# Patient Record
Sex: MALE | Race: WHITE | NOT HISPANIC OR LATINO | Employment: FULL TIME | URBAN - METROPOLITAN AREA
[De-identification: names, ages, dates, MRNs, and addresses within clinical notes are randomized per-mention and may not be internally consistent; named-entity substitution may affect disease eponyms.]

---

## 2023-06-07 ENCOUNTER — TELEPHONE (OUTPATIENT)
Dept: FAMILY MEDICINE | Facility: CLINIC | Age: 48
End: 2023-06-07

## 2023-06-09 RX ORDER — AMLODIPINE BESYLATE 5 MG/1
5 TABLET ORAL
COMMUNITY
Start: 2023-06-07 | End: 2023-09-19 | Stop reason: ALTCHOICE

## 2023-09-15 PROBLEM — E66.9 OBESITY, UNSPECIFIED: Status: ACTIVE | Noted: 2023-09-15

## 2023-09-15 PROBLEM — I10 ESSENTIAL HYPERTENSION, BENIGN: Status: ACTIVE | Noted: 2023-09-15

## 2023-09-19 ENCOUNTER — OFFICE VISIT (OUTPATIENT)
Dept: FAMILY MEDICINE | Facility: CLINIC | Age: 48
End: 2023-09-19
Payer: COMMERCIAL

## 2023-09-19 VITALS
RESPIRATION RATE: 16 BRPM | OXYGEN SATURATION: 96 % | BODY MASS INDEX: 46.65 KG/M2 | HEIGHT: 69 IN | TEMPERATURE: 98 F | SYSTOLIC BLOOD PRESSURE: 145 MMHG | DIASTOLIC BLOOD PRESSURE: 90 MMHG | WEIGHT: 315 LBS | HEART RATE: 71 BPM

## 2023-09-19 DIAGNOSIS — I10 ESSENTIAL HYPERTENSION, BENIGN: ICD-10-CM

## 2023-09-19 DIAGNOSIS — K13.4 GRANULOMA AND GRANULOMA-LIKE LESIONS OF ORAL MUCOSA: Primary | ICD-10-CM

## 2023-09-19 DIAGNOSIS — G47.33 OSA (OBSTRUCTIVE SLEEP APNEA): ICD-10-CM

## 2023-09-19 DIAGNOSIS — E66.01 CLASS 3 SEVERE OBESITY DUE TO EXCESS CALORIES WITH SERIOUS COMORBIDITY AND BODY MASS INDEX (BMI) OF 50.0 TO 59.9 IN ADULT: ICD-10-CM

## 2023-09-19 PROBLEM — F17.200 SMOKER: Status: ACTIVE | Noted: 2023-09-19

## 2023-09-19 PROBLEM — F41.9 ANXIETY: Status: ACTIVE | Noted: 2023-09-19

## 2023-09-19 PROBLEM — E66.9 OBESITY, UNSPECIFIED: Status: ACTIVE | Noted: 2023-09-19

## 2023-09-19 PROBLEM — E66.9 OBESITY, UNSPECIFIED: Status: RESOLVED | Noted: 2023-09-19 | Resolved: 2023-09-19

## 2023-09-19 PROBLEM — E66.813 CLASS 3 SEVERE OBESITY DUE TO EXCESS CALORIES WITH SERIOUS COMORBIDITY AND BODY MASS INDEX (BMI) OF 50.0 TO 59.9 IN ADULT: Status: ACTIVE | Noted: 2023-09-15

## 2023-09-19 PROBLEM — E55.9 VITAMIN D DEFICIENCY: Status: ACTIVE | Noted: 2023-09-19

## 2023-09-19 PROBLEM — Z87.442 HISTORY OF KIDNEY STONES: Status: ACTIVE | Noted: 2023-09-19

## 2023-09-19 PROBLEM — R79.89 ELEVATED LFTS: Status: ACTIVE | Noted: 2023-09-19

## 2023-09-19 PROBLEM — K76.0 FATTY LIVER: Status: ACTIVE | Noted: 2023-09-19

## 2023-09-19 PROBLEM — K21.9 GERD (GASTROESOPHAGEAL REFLUX DISEASE): Status: ACTIVE | Noted: 2023-09-19

## 2023-09-19 PROCEDURE — 1160F RVW MEDS BY RX/DR IN RCRD: CPT | Mod: CPTII,,, | Performed by: FAMILY MEDICINE

## 2023-09-19 PROCEDURE — 3077F PR MOST RECENT SYSTOLIC BLOOD PRESSURE >= 140 MM HG: ICD-10-PCS | Mod: CPTII,,, | Performed by: FAMILY MEDICINE

## 2023-09-19 PROCEDURE — 1159F PR MEDICATION LIST DOCUMENTED IN MEDICAL RECORD: ICD-10-PCS | Mod: CPTII,,, | Performed by: FAMILY MEDICINE

## 2023-09-19 PROCEDURE — 3080F PR MOST RECENT DIASTOLIC BLOOD PRESSURE >= 90 MM HG: ICD-10-PCS | Mod: CPTII,,, | Performed by: FAMILY MEDICINE

## 2023-09-19 PROCEDURE — 1160F PR REVIEW ALL MEDS BY PRESCRIBER/CLIN PHARMACIST DOCUMENTED: ICD-10-PCS | Mod: CPTII,,, | Performed by: FAMILY MEDICINE

## 2023-09-19 PROCEDURE — 99214 PR OFFICE/OUTPT VISIT, EST, LEVL IV, 30-39 MIN: ICD-10-PCS | Mod: ,,, | Performed by: FAMILY MEDICINE

## 2023-09-19 PROCEDURE — 1159F MED LIST DOCD IN RCRD: CPT | Mod: CPTII,,, | Performed by: FAMILY MEDICINE

## 2023-09-19 PROCEDURE — 3077F SYST BP >= 140 MM HG: CPT | Mod: CPTII,,, | Performed by: FAMILY MEDICINE

## 2023-09-19 PROCEDURE — 3008F BODY MASS INDEX DOCD: CPT | Mod: CPTII,,, | Performed by: FAMILY MEDICINE

## 2023-09-19 PROCEDURE — 3080F DIAST BP >= 90 MM HG: CPT | Mod: CPTII,,, | Performed by: FAMILY MEDICINE

## 2023-09-19 PROCEDURE — 3008F PR BODY MASS INDEX (BMI) DOCUMENTED: ICD-10-PCS | Mod: CPTII,,, | Performed by: FAMILY MEDICINE

## 2023-09-19 PROCEDURE — 99214 OFFICE O/P EST MOD 30 MIN: CPT | Mod: ,,, | Performed by: FAMILY MEDICINE

## 2023-09-19 RX ORDER — AMLODIPINE AND BENAZEPRIL HYDROCHLORIDE 5; 20 MG/1; MG/1
1 CAPSULE ORAL DAILY
Qty: 30 CAPSULE | Refills: 2 | Status: SHIPPED | OUTPATIENT
Start: 2023-09-19

## 2023-09-19 RX ORDER — AMLODIPINE AND BENAZEPRIL HYDROCHLORIDE 5; 20 MG/1; MG/1
1 CAPSULE ORAL DAILY
Qty: 90 CAPSULE | Refills: 3 | Status: SHIPPED | OUTPATIENT
Start: 2023-09-19 | End: 2023-09-19

## 2023-09-19 NOTE — PROGRESS NOTES
"  Patient Name: Sotero Wray     Patient ID: [id number]     Chief Complaint: Medication Refill      HPI:     Sotero Wray is a 48 y.o. male here today for Hypertension follow up.  Patient says he has a wart on his right thigh.    Past Medical History:   Diagnosis Date    Anxiety     Elevated LFTs     Essential hypertension, benign     Fatty liver     GERD (gastroesophageal reflux disease)     History of kidney stones     Obesity, unspecified     PILI (obstructive sleep apnea)     Smoker     Vitamin D deficiency         Past Surgical History:   Procedure Laterality Date    ADENOIDECTOMY      SINUS SURGERY      TONSILLECTOMY          Social History     Socioeconomic History    Marital status: Single    Number of children: 3   Tobacco Use    Smoking status: Every Day     Current packs/day: 1.00     Average packs/day: 1 pack/day for 25.7 years (25.7 ttl pk-yrs)     Types: Cigarettes     Start date: 1998    Smokeless tobacco: Never   Substance and Sexual Activity    Alcohol use: Yes     Comment: occasionally    Drug use: Yes        Current Outpatient Medications   Medication Instructions    amlodipine-benazepril 5-20 mg (LOTREL) 5-20 mg per capsule 1 capsule, Oral, Daily       Review of patient's allergies indicates:  No Known Allergies       There is no immunization history on file for this patient.    Patient Care Team:  Wan Mendoza Sr., MD as PCP - General (Family Medicine)     Subjective:     Review of Systems    10 point review of systems conducted, negative except as stated in the history of present illness. See HPI for details.    Objective:     Visit Vitals  BP (!) 145/90 (BP Location: Right arm, Patient Position: Sitting, BP Method: Large (Manual))   Pulse 71   Temp 98.2 °F (36.8 °C)   Resp 16   Ht 5' 9" (1.753 m)   Wt (!) 162.9 kg (359 lb 3.2 oz)   SpO2 96%   BMI 53.04 kg/m²       Physical Exam  Constitutional:       Appearance: He is obese.   HENT:      Head: Normocephalic and atraumatic. "   Cardiovascular:      Rate and Rhythm: Normal rate and regular rhythm.   Pulmonary:      Effort: Pulmonary effort is normal.      Breath sounds: Normal breath sounds.   Abdominal:      Palpations: Abdomen is soft.      Tenderness: There is no abdominal tenderness.   Musculoskeletal:         General: No swelling or tenderness. Normal range of motion.      Cervical back: Normal range of motion and neck supple.      Right lower leg: No edema.      Left lower leg: No edema.   Lymphadenopathy:      Cervical: No cervical adenopathy.   Skin:     General: Skin is warm and dry.      Comments: Patient has an approximately 2-1/4 x 11/4 cm red ,raised, firm, indurated lesion on the inside of his right mid thigh.  There is no pointing or drainage.  It is minimally tender.   Neurological:      General: No focal deficit present.      Mental Status: He is alert and oriented to person, place, and time.   Psychiatric:         Mood and Affect: Mood normal.           Assessment:       ICD-10-CM ICD-9-CM   1. PossibleGranuloma   K13.4 528.9   2. Essential hypertension, benign  I10 401.1   3. PILI (obstructive sleep apnea)  G47.33 327.23   4. Class 3 severe obesity due to excess calories with serious comorbidity and body mass index (BMI) of 50.0 to 59.9 in adult  E66.01 278.01    Z68.43 V85.43        Plan:     1. PossibleGranuloma   Comments:  We will refer patient to a surgeon for excision.  Orders:  -     Ambulatory referral/consult to General Surgery; Future; Expected date: 09/19/2023    2. Essential hypertension, benign  Overview:  Patient is not at goal.  Low Sodium Diet (DASH Diet - Less than 2 grams of sodium per day).  Monitor blood pressure daily and log. Report consistent numbers greater than 140/90.  Maintain healthy weight with goal BMI <30. Exercise 30 minutes per day, 5 days per week.    Assessment & Plan:  D/C Norvasc.  Will start Lotrel 5-20 mg daily.    Orders:  -     Discontinue: amlodipine-benazepril 5-20 mg (LOTREL)  5-20 mg per capsule; Take 1 capsule by mouth once daily.  Dispense: 90 capsule; Refill: 3  -     amlodipine-benazepril 5-20 mg (LOTREL) 5-20 mg per capsule; Take 1 capsule by mouth once daily.  Dispense: 30 capsule; Refill: 2    3. PILI (obstructive sleep apnea)  Overview:  Wears CPAP/BiPAP and reports compliance nightly. Life time use expected.  Has experienced improved daytime fatigue.  Avoid excessive alcohol, smoking and overuse of sedatives at bedtime.  Weight management discussed. Goal BMI <30.  Adjust sleep position PRN.      4. Class 3 severe obesity due to excess calories with serious comorbidity and body mass index (BMI) of 50.0 to 59.9 in adult  Overview:  Body mass index is 53.04 kg/m².  Goal BMI <30.  Exercise 5 times a week for 30 minutes per day.  Avoid soda, simple sugars, excessive rice, potatoes or bread. Limit fast foods and fried foods.  Choose complex carbs in moderation (example: green vegetables, beans, oatmeal). Eat plenty of fresh fruits and vegetables with lean meats daily.  Do not skip meals. Eat a balanced portion size.  Avoid fad diets. Consider permanent healthy life style changes.           [] Discussed lab findings with the patient.  [x] Discussed diet, exercise and if appropriate, weight loss.  [x] Instructions and information, including risks and benefits of prescribed medication(s) have been reviewed with the patient and patient verbalizes understanding. Questions have been answered to the patient's satisfaction.  [] Appropriate counseling has been given regarding anxiety and depressive issues that were discussed today.  [] Any lab drawn today will be reviewed by physician at the time it is received and appropriate recommendations bill be made and discussed with patient.     Follow up in about 3 weeks (around 10/10/2023) for BP Check with Nurse.   In addition to their scheduled follow up, the patient has also been instructed to follow up on as needed basis.     Future Appointments    Date Time Provider Department Center   10/9/2023 10:30 AM NURSE, [facility] BRITTNEE Mendoza Sr, MD

## 2023-10-09 ENCOUNTER — CLINICAL SUPPORT (OUTPATIENT)
Dept: FAMILY MEDICINE | Facility: CLINIC | Age: 48
End: 2023-10-09
Payer: COMMERCIAL

## 2023-10-09 VITALS
OXYGEN SATURATION: 95 % | BODY MASS INDEX: 46.65 KG/M2 | WEIGHT: 315 LBS | HEART RATE: 82 BPM | DIASTOLIC BLOOD PRESSURE: 82 MMHG | HEIGHT: 69 IN | RESPIRATION RATE: 16 BRPM | SYSTOLIC BLOOD PRESSURE: 122 MMHG | TEMPERATURE: 97 F

## 2023-10-09 DIAGNOSIS — I10 ESSENTIAL HYPERTENSION, BENIGN: Primary | ICD-10-CM

## 2023-10-09 NOTE — PROGRESS NOTES
Patient was given blood pressure medicine at appointment on 9/19/2023, and came in today for a blood pressure check. Blood pressure is regulated to 122/82. Patient was scheduled for full lab and 6 month follow-up appointment.

## 2023-10-16 ENCOUNTER — TELEPHONE (OUTPATIENT)
Dept: FAMILY MEDICINE | Facility: CLINIC | Age: 48
End: 2023-10-16
Payer: COMMERCIAL

## 2023-10-16 NOTE — TELEPHONE ENCOUNTER
----- Message from Delia Wray sent at 10/16/2023  2:07 PM CDT -----  Regarding: CPAP Letter  Jason needs a letter of compliance for his CPAP.  He is taking a CDL physicial today.

## 2023-10-16 NOTE — TELEPHONE ENCOUNTER
Spoke with patient in regards to CPAP letter of compliance and let him know that he has to contact the company he gets his CPAP machine from in order to receive that letter.

## 2024-06-27 DIAGNOSIS — I10 ESSENTIAL HYPERTENSION, BENIGN: ICD-10-CM

## 2024-06-27 RX ORDER — AMLODIPINE AND BENAZEPRIL HYDROCHLORIDE 5; 20 MG/1; MG/1
1 CAPSULE ORAL DAILY
Qty: 30 CAPSULE | Refills: 2 | Status: SHIPPED | OUTPATIENT
Start: 2024-06-27

## 2024-09-09 ENCOUNTER — OFFICE VISIT (OUTPATIENT)
Dept: FAMILY MEDICINE | Facility: CLINIC | Age: 49
End: 2024-09-09
Payer: COMMERCIAL

## 2024-09-09 VITALS
RESPIRATION RATE: 20 BRPM | BODY MASS INDEX: 46.65 KG/M2 | OXYGEN SATURATION: 95 % | HEIGHT: 69 IN | HEART RATE: 69 BPM | SYSTOLIC BLOOD PRESSURE: 120 MMHG | DIASTOLIC BLOOD PRESSURE: 83 MMHG | WEIGHT: 315 LBS | TEMPERATURE: 98 F

## 2024-09-09 DIAGNOSIS — E78.2 MIXED HYPERLIPIDEMIA: ICD-10-CM

## 2024-09-09 DIAGNOSIS — Z12.11 SCREENING FOR COLON CANCER: ICD-10-CM

## 2024-09-09 DIAGNOSIS — E66.01 CLASS 3 SEVERE OBESITY DUE TO EXCESS CALORIES WITH SERIOUS COMORBIDITY AND BODY MASS INDEX (BMI) OF 50.0 TO 59.9 IN ADULT: ICD-10-CM

## 2024-09-09 DIAGNOSIS — R74.01 TRANSAMINITIS: ICD-10-CM

## 2024-09-09 DIAGNOSIS — I10 ESSENTIAL HYPERTENSION, BENIGN: ICD-10-CM

## 2024-09-09 DIAGNOSIS — E55.9 VITAMIN D DEFICIENCY: Primary | ICD-10-CM

## 2024-09-09 PROBLEM — E78.5 HYPERLIPIDEMIA: Status: ACTIVE | Noted: 2024-09-09

## 2024-09-09 PROCEDURE — 3008F BODY MASS INDEX DOCD: CPT | Mod: CPTII,,, | Performed by: FAMILY MEDICINE

## 2024-09-09 PROCEDURE — 3074F SYST BP LT 130 MM HG: CPT | Mod: CPTII,,, | Performed by: FAMILY MEDICINE

## 2024-09-09 PROCEDURE — 1159F MED LIST DOCD IN RCRD: CPT | Mod: CPTII,,, | Performed by: FAMILY MEDICINE

## 2024-09-09 PROCEDURE — 1160F RVW MEDS BY RX/DR IN RCRD: CPT | Mod: CPTII,,, | Performed by: FAMILY MEDICINE

## 2024-09-09 PROCEDURE — 4010F ACE/ARB THERAPY RXD/TAKEN: CPT | Mod: CPTII,,, | Performed by: FAMILY MEDICINE

## 2024-09-09 PROCEDURE — 3044F HG A1C LEVEL LT 7.0%: CPT | Mod: CPTII,,, | Performed by: FAMILY MEDICINE

## 2024-09-09 PROCEDURE — 3079F DIAST BP 80-89 MM HG: CPT | Mod: CPTII,,, | Performed by: FAMILY MEDICINE

## 2024-09-09 PROCEDURE — 99214 OFFICE O/P EST MOD 30 MIN: CPT | Mod: ,,, | Performed by: FAMILY MEDICINE

## 2024-09-09 RX ORDER — AMLODIPINE AND BENAZEPRIL HYDROCHLORIDE 5; 20 MG/1; MG/1
1 CAPSULE ORAL DAILY
Qty: 90 CAPSULE | Refills: 1 | Status: SHIPPED | OUTPATIENT
Start: 2024-09-09 | End: 2025-03-08

## 2024-09-09 NOTE — ASSESSMENT & PLAN NOTE
We would like referral to obesity medicine/bariatric   Insurance will not pay for semaglutide/Tirzepatide for weight loss  Would consider bariatric if insurance paid for

## 2024-09-09 NOTE — PROGRESS NOTES
Family Medicine    Patient ID: [id number]     Chief Complaint: Follow-up (Lab results), Medication Refill (Blood pressure), and Health Maintenance (Consult about weight management )      HPI:     Sotero Wray is a 49 y.o. male here today for a follow up.     Past Medical History:   Diagnosis Date    Anxiety     Elevated LFTs     Essential hypertension, benign     Fatty liver     GERD (gastroesophageal reflux disease)     History of kidney stones     Obesity, unspecified     PILI (obstructive sleep apnea)     Smoker     Vitamin D deficiency         Past Surgical History:   Procedure Laterality Date    ADENOIDECTOMY      SINUS SURGERY      TONSILLECTOMY          Social History     Tobacco Use    Smoking status: Every Day     Current packs/day: 1.00     Average packs/day: 1 pack/day for 26.7 years (26.7 ttl pk-yrs)     Types: Cigarettes     Start date: 1998    Smokeless tobacco: Never   Substance and Sexual Activity    Alcohol use: Yes     Comment: occasionally    Drug use: Not Currently    Sexual activity: Not Currently        Current Outpatient Medications   Medication Instructions    amlodipine-benazepril 5-20 mg (LOTREL) 5-20 mg per capsule 1 capsule, Oral, Daily       Review of patient's allergies indicates:  No Known Allergies     Patient Care Team:  Wan Mendoza Sr., MD as PCP - General (Family Medicine)     Subjective:     Review of Systems   Constitutional:  Negative for activity change, appetite change, fever and unexpected weight change.   HENT:  Negative for congestion, dental problem, rhinorrhea and trouble swallowing.    Eyes:  Negative for visual disturbance.   Respiratory:  Negative for chest tightness and shortness of breath.    Cardiovascular:  Negative for chest pain, palpitations and leg swelling.   Gastrointestinal:  Negative for abdominal pain, blood in stool, constipation, diarrhea, nausea and vomiting.   Genitourinary:  Negative for difficulty urinating.   Musculoskeletal:  Negative  "for gait problem.   Skin:  Negative for rash and wound.   Neurological:  Negative for dizziness, syncope, speech difficulty, weakness and light-headedness.   Psychiatric/Behavioral:  Negative for confusion and suicidal ideas.        12 point review of systems conducted, negative except as stated in the history of present illness. See HPI for details.    Objective:     Visit Vitals  /83 (BP Location: Left arm, Patient Position: Sitting)   Pulse 69   Temp 98 °F (36.7 °C)   Resp 20   Ht 5' 9" (1.753 m)   Wt (!) 160.8 kg (354 lb 9.6 oz)   SpO2 95%   BMI 52.37 kg/m²       Physical Exam  Vitals and nursing note reviewed.   Constitutional:       General: He is not in acute distress.     Appearance: Normal appearance. He is not ill-appearing, toxic-appearing or diaphoretic.   HENT:      Head: Normocephalic and atraumatic.      Right Ear: Tympanic membrane, ear canal and external ear normal. There is no impacted cerumen.      Left Ear: Tympanic membrane, ear canal and external ear normal. There is no impacted cerumen.      Nose: No congestion or rhinorrhea.      Mouth/Throat:      Pharynx: Oropharynx is clear. No oropharyngeal exudate or posterior oropharyngeal erythema.   Eyes:      General:         Right eye: No discharge.         Left eye: No discharge.      Extraocular Movements: Extraocular movements intact.      Conjunctiva/sclera: Conjunctivae normal.   Cardiovascular:      Rate and Rhythm: Normal rate and regular rhythm.      Heart sounds: No murmur heard.     No friction rub. No gallop.   Pulmonary:      Effort: Pulmonary effort is normal. No respiratory distress.      Breath sounds: Normal breath sounds.   Musculoskeletal:      Right lower leg: No edema.      Left lower leg: No edema.   Skin:     Capillary Refill: Capillary refill takes less than 2 seconds.   Neurological:      Mental Status: He is alert and oriented to person, place, and time.   Psychiatric:         Mood and Affect: Mood normal.         " "Behavior: Behavior normal.         Thought Content: Thought content normal.         Labs Reviewed:     Chemistry:  Lab Results   Component Value Date     (H) 09/04/2024    K 4.7 09/04/2024    BUN 21 09/04/2024    CREATININE 1.10 09/04/2024    EGFRNORACEVR 83 09/04/2024    CALCIUM 8.9 09/04/2024    ALBUMIN 4.2 09/04/2024    BILIDIR 0.22 02/08/2024    AST 24 09/04/2024    ALT 53 (H) 09/04/2024    UYQSPWXE05JP 29.2 (L) 09/04/2024    TSH 1.190 09/04/2024        Lab Results   Component Value Date    HGBA1C 5.6 09/04/2024        Hematology:  Lab Results   Component Value Date    WBC 7.1 09/04/2024    HGB 14.9 09/04/2024    HCT 47.8 09/04/2024     09/04/2024       Lipid Panel:  Lab Results   Component Value Date    CHOL 192 09/04/2024    HDL 62 09/04/2024    TRIG 108 09/04/2024        Urine:  No results found for: "COLORUA", "APPEARANCEUA", "SGUA", "PHUA", "PROTEINUA", "GLUCOSEUA", "KETONESUA", "BLOODUA", "NITRITESUA", "LEUKOCYTESUR", "RBCUA", "WBCUA", "BACTERIA", "SQEPUA", "HYALINECASTS", "CREATRANDUR", "PROTEINURINE", "UPROTCREA"     Assessment:       ICD-10-CM ICD-9-CM   1. Vitamin D deficiency  E55.9 268.9   2. Mixed hyperlipidemia  E78.2 272.2   3. Transaminitis  R74.01 790.4   4. Essential hypertension, benign  I10 401.1   5. Class 3 severe obesity due to excess calories with serious comorbidity and body mass index (BMI) of 50.0 to 59.9 in adult  E66.01 278.01    Z68.43 V85.43   6. Screening for colon cancer  Z12.11 V76.51        Plan:     1. Vitamin D deficiency  Overview:  Background:  Vitamin-D level today:  29  Preferred range per Endocrine Society is 40-60 ng/mL (Ref)  Current supplementation:  Unknown    Plan:  3000 IU OTC vitamin-D once daily  Repeat vitamin-D in 3 months      Orders:  -     Vitamin D; Future; Expected date: 03/09/2025    2. Mixed hyperlipidemia  Overview:  Background:  Medication:  None  Risks:  BMI 52, current smoker  Optimal LDL-C: <100 (Ref)  Optimal non HDL-C: <130 " (Ref)  Optimal ApoB: <100 (Ref)  Risk factors:  BMI greater than 30  Current LDL-C:  111  Current non HDL-C:  130  Current ApoB:     Assessment/Plan:  Not at goal  Higher risk with BMI 52 and current smoker  Declines statin  Repeat lipids six-month  Recommend lifestyle modifications:  ? Diet:   - Adopt a mediterranean diet, with an emphasis on vegetables, fruits (limited tropical fruits), fish, poultry, non-tropical vegetable oils, nuts; minimize red meat.  - Significantly reduce consumption of bread, pasta, rice, potatoes, sweets, and sugary drinks  - Reduce saturated fat; eliminate fried foods and trans fats; choose grilled over fried food  - limit ultra processed food (items that come in a box or a wrapper) and eat a whole food diet  - Limit your portion sizes   ? Exercise:  - Regular aerobic activity (at least 150 minutes/week of moderate-intensity activity, 75 minutes/week of vigorous intensity activity, or an equivalent combination of both)  - Resistance exercises (weights, bands, body-weight) at least two times per week      AACE/ACE Consensus Statement on Dyslipidemia Management   (https://doi.org/10.4158/GD-5141-9688)      Orders:  -     Lipid Panel; Future; Expected date: 03/09/2025    3. Transaminitis  Overview:     Background:  AST normal, ALT in the 40s  A BMI greater than 50  ALP wnl  Total bilirubin wnl  Non-significant alcohol consumption  Non-significant acetaminophen use  HepC screen not done yet  TSH wnl     Plan:  Likely fatty liver  Liver ultrasound today   CMP 6 months  Obesity medicine referral made      Orders:  -     US Abdomen Limited_Liver; Future; Expected date: 09/09/2024  -     Comprehensive Metabolic Panel; Future; Expected date: 03/09/2025    4. Essential hypertension, benign  Overview:  Lotrel 5-20 daily    Assessment & Plan:  At goal   Continue above medication at same dose    Orders:  -     amlodipine-benazepril 5-20 mg (LOTREL) 5-20 mg per capsule; Take 1 capsule by mouth once  daily.  Dispense: 90 capsule; Refill: 1    5. Class 3 severe obesity due to excess calories with serious comorbidity and body mass index (BMI) of 50.0 to 59.9 in adult  Overview:  Body mass index is 52.37 kg/m².    Weight has been steady at BMI over 52   Tried and failed caloric restriction  No diabetes, A1c 5.6, elevated cholesterol  Current smoker    Assessment & Plan:  We would like referral to obesity medicine/bariatric   Insurance will not pay for semaglutide/Tirzepatide for weight loss  Would consider bariatric if insurance paid for    Orders:  -     Ambulatory referral/consult to Bariatric/Obesity Medicine; Future; Expected date: 09/16/2024    6. Screening for colon cancer  Comments:  No family or personal history.  Screening due we will order  Orders:  -     Ambulatory referral/consult to Gastroenterology; Future; Expected date: 09/16/2024     Declines all vaccines  We will discuss smoking at next visit    Follow up in about 6 months (around 3/9/2025) for Follow Up, With Labs Prior to Visit. In addition to their scheduled follow up, the patient has also been instructed to follow up on as needed basis.     No future appointments.     Guillermo Larios MD

## 2024-09-30 ENCOUNTER — HOSPITAL ENCOUNTER (OUTPATIENT)
Dept: RADIOLOGY | Facility: HOSPITAL | Age: 49
Discharge: HOME | End: 2024-09-30
Attending: FAMILY MEDICINE
Payer: COMMERCIAL

## 2024-09-30 DIAGNOSIS — R74.01 TRANSAMINITIS: ICD-10-CM

## 2024-09-30 PROCEDURE — 76705 ECHO EXAM OF ABDOMEN: CPT | Mod: TC

## 2024-10-02 ENCOUNTER — TELEPHONE (OUTPATIENT)
Dept: FAMILY MEDICINE | Facility: CLINIC | Age: 49
End: 2024-10-02
Payer: COMMERCIAL

## 2024-10-02 NOTE — TELEPHONE ENCOUNTER
----- Message from Guillermo Larios MD sent at 9/30/2024  4:58 PM CDT -----  Enlarged, fatty liver.  Treatment for this is weight loss.  We will repeat labs in 6 months

## 2024-10-21 ENCOUNTER — TELEPHONE (OUTPATIENT)
Dept: FAMILY MEDICINE | Facility: CLINIC | Age: 49
End: 2024-10-21
Payer: COMMERCIAL

## 2024-10-21 NOTE — TELEPHONE ENCOUNTER
Referral sent to Dr Ford  for screening colonoscopy appointment on 10/19/2024. Patient was a no show  per Magdalene at Dr Padilla's office.

## 2025-03-05 ENCOUNTER — RESULTS FOLLOW-UP (OUTPATIENT)
Dept: FAMILY MEDICINE | Facility: CLINIC | Age: 50
End: 2025-03-05

## 2025-03-10 ENCOUNTER — OFFICE VISIT (OUTPATIENT)
Dept: FAMILY MEDICINE | Facility: CLINIC | Age: 50
End: 2025-03-10
Payer: COMMERCIAL

## 2025-03-10 VITALS
HEART RATE: 74 BPM | BODY MASS INDEX: 46.65 KG/M2 | WEIGHT: 315 LBS | SYSTOLIC BLOOD PRESSURE: 122 MMHG | OXYGEN SATURATION: 96 % | DIASTOLIC BLOOD PRESSURE: 80 MMHG | HEIGHT: 69 IN | TEMPERATURE: 98 F | RESPIRATION RATE: 18 BRPM

## 2025-03-10 DIAGNOSIS — G89.29 CHRONIC PAIN OF RIGHT ANKLE: ICD-10-CM

## 2025-03-10 DIAGNOSIS — E55.9 VITAMIN D DEFICIENCY: ICD-10-CM

## 2025-03-10 DIAGNOSIS — E78.2 MIXED HYPERLIPIDEMIA: Primary | ICD-10-CM

## 2025-03-10 DIAGNOSIS — Z12.12 ENCOUNTER FOR COLORECTAL CANCER SCREENING: ICD-10-CM

## 2025-03-10 DIAGNOSIS — R74.01 TRANSAMINITIS: ICD-10-CM

## 2025-03-10 DIAGNOSIS — M25.571 CHRONIC PAIN OF RIGHT ANKLE: ICD-10-CM

## 2025-03-10 DIAGNOSIS — E87.5 HYPERKALEMIA: ICD-10-CM

## 2025-03-10 DIAGNOSIS — I10 ESSENTIAL HYPERTENSION, BENIGN: ICD-10-CM

## 2025-03-10 DIAGNOSIS — Z12.11 ENCOUNTER FOR COLORECTAL CANCER SCREENING: ICD-10-CM

## 2025-03-10 DIAGNOSIS — E66.01 CLASS 3 SEVERE OBESITY DUE TO EXCESS CALORIES WITH SERIOUS COMORBIDITY AND BODY MASS INDEX (BMI) OF 50.0 TO 59.9 IN ADULT: ICD-10-CM

## 2025-03-10 DIAGNOSIS — E66.813 CLASS 3 SEVERE OBESITY DUE TO EXCESS CALORIES WITH SERIOUS COMORBIDITY AND BODY MASS INDEX (BMI) OF 50.0 TO 59.9 IN ADULT: ICD-10-CM

## 2025-03-10 DIAGNOSIS — K76.0 FATTY LIVER: ICD-10-CM

## 2025-03-10 PROCEDURE — 3079F DIAST BP 80-89 MM HG: CPT | Mod: CPTII,,, | Performed by: FAMILY MEDICINE

## 2025-03-10 PROCEDURE — 1160F RVW MEDS BY RX/DR IN RCRD: CPT | Mod: CPTII,,, | Performed by: FAMILY MEDICINE

## 2025-03-10 PROCEDURE — 99214 OFFICE O/P EST MOD 30 MIN: CPT | Mod: ,,, | Performed by: FAMILY MEDICINE

## 2025-03-10 PROCEDURE — 1159F MED LIST DOCD IN RCRD: CPT | Mod: CPTII,,, | Performed by: FAMILY MEDICINE

## 2025-03-10 PROCEDURE — 3074F SYST BP LT 130 MM HG: CPT | Mod: CPTII,,, | Performed by: FAMILY MEDICINE

## 2025-03-10 PROCEDURE — 3044F HG A1C LEVEL LT 7.0%: CPT | Mod: CPTII,,, | Performed by: FAMILY MEDICINE

## 2025-03-10 PROCEDURE — 3008F BODY MASS INDEX DOCD: CPT | Mod: CPTII,,, | Performed by: FAMILY MEDICINE

## 2025-03-10 PROCEDURE — G2211 COMPLEX E/M VISIT ADD ON: HCPCS | Mod: ,,, | Performed by: FAMILY MEDICINE

## 2025-03-10 RX ORDER — AMLODIPINE AND BENAZEPRIL HYDROCHLORIDE 5; 20 MG/1; MG/1
1 CAPSULE ORAL DAILY
Qty: 90 CAPSULE | Refills: 1 | Status: SHIPPED | OUTPATIENT
Start: 2025-03-10 | End: 2025-09-06

## 2025-03-10 NOTE — ASSESSMENT & PLAN NOTE
Bariatric referral made but patient had to cancel due to work   Declines another referral   He will try calorie counting for the next six-months again  If not successful in 6 months, we will refer to bariatric again

## 2025-03-10 NOTE — PROGRESS NOTES
Family Medicine    Patient ID: [id number]     Chief Complaint: Annual Exam      HPI:     Sotero Wray is a 50 y.o. male here today for follow up on labs.    Past Medical History:   Diagnosis Date    Anxiety     Elevated LFTs     Essential hypertension, benign     Fatty liver     GERD (gastroesophageal reflux disease)     History of kidney stones     Obesity, unspecified     PILI (obstructive sleep apnea)     Smoker     Vitamin D deficiency         Past Surgical History:   Procedure Laterality Date    ADENOIDECTOMY      CHOLECYSTECTOMY      SINUS SURGERY      TONSILLECTOMY          Social History     Tobacco Use    Smoking status: Every Day     Current packs/day: 1.00     Average packs/day: 1 pack/day for 27.2 years (27.2 ttl pk-yrs)     Types: Cigarettes     Start date: 1998    Smokeless tobacco: Never   Substance and Sexual Activity    Alcohol use: Yes     Comment: occasionally    Drug use: Not Currently    Sexual activity: Not Currently        Current Outpatient Medications   Medication Instructions    amlodipine-benazepril 5-20 mg (LOTREL) 5-20 mg per capsule 1 capsule, Oral, Daily       Review of patient's allergies indicates:  No Known Allergies     Patient Care Team:  Wan Mendoza Sr., MD as PCP - General (Family Medicine)     Subjective:     Review of Systems   Constitutional:  Negative for activity change, appetite change, fever and unexpected weight change.   HENT:  Negative for congestion, dental problem, rhinorrhea (DHT) and trouble swallowing.    Eyes:  Negative for visual disturbance.   Respiratory:  Negative for chest tightness and shortness of breath.    Cardiovascular:  Negative for chest pain, palpitations and leg swelling.   Gastrointestinal:  Negative for abdominal pain, blood in stool, constipation, diarrhea, nausea and vomiting.   Genitourinary:  Negative for difficulty urinating.   Musculoskeletal:  Negative for gait problem.   Skin:  Negative for rash and wound.   Neurological:   "Negative for dizziness, syncope, speech difficulty, weakness and light-headedness.   Psychiatric/Behavioral:  Negative for confusion and suicidal ideas.        12 point review of systems conducted, negative except as stated in the history of present illness. See HPI for details.    Objective:     Visit Vitals  /80   Pulse 74   Temp 98.4 °F (36.9 °C) (Skin)   Resp 18   Ht 5' 9" (1.753 m)   Wt (!) 159.2 kg (351 lb)   SpO2 96%   BMI 51.83 kg/m²       Physical Exam  Vitals and nursing note reviewed.   Constitutional:       General: He is not in acute distress.     Appearance: Normal appearance. He is not ill-appearing, toxic-appearing or diaphoretic.   HENT:      Head: Normocephalic and atraumatic.      Right Ear: Tympanic membrane, ear canal and external ear normal. There is no impacted cerumen.      Left Ear: Tympanic membrane, ear canal and external ear normal. There is no impacted cerumen.      Nose: No congestion or rhinorrhea.      Mouth/Throat:      Pharynx: Oropharynx is clear. No oropharyngeal exudate or posterior oropharyngeal erythema.   Eyes:      General:         Right eye: No discharge.         Left eye: No discharge.      Extraocular Movements: Extraocular movements intact.      Conjunctiva/sclera: Conjunctivae normal.   Cardiovascular:      Rate and Rhythm: Normal rate and regular rhythm.      Heart sounds: No murmur heard.     No friction rub. No gallop.   Pulmonary:      Effort: Pulmonary effort is normal. No respiratory distress.      Breath sounds: Normal breath sounds.   Musculoskeletal:      Right lower leg: No edema.      Left lower leg: No edema.   Skin:     Capillary Refill: Capillary refill takes less than 2 seconds.   Neurological:      Mental Status: He is alert and oriented to person, place, and time.   Psychiatric:         Mood and Affect: Mood normal.         Behavior: Behavior normal.         Thought Content: Thought content normal.         Labs Reviewed:     Chemistry:  Lab Results " "  Component Value Date     (H) 03/03/2025    K 5.5 (H) 03/03/2025    BUN 21 03/03/2025    CREATININE 1.10 03/03/2025    EGFRNORACEVR 82 03/03/2025    CALCIUM 9.3 03/03/2025    ALBUMIN 4.3 03/03/2025    BILIDIR 0.22 02/08/2024    AST 24 03/03/2025    ALT 44 03/03/2025    FEWSLISY46AV 26.9 (L) 03/03/2025    TSH 1.350 03/03/2025        Lab Results   Component Value Date    HGBA1C 5.5 03/03/2025        Hematology:  Lab Results   Component Value Date    WBC 9.3 03/03/2025    HGB 15.6 03/03/2025    HCT 50.2 03/03/2025     03/03/2025       Lipid Panel:  Lab Results   Component Value Date    CHOL 182 03/03/2025    HDL 65 03/03/2025    TRIG 119 03/03/2025        Urine:  No results found for: "COLORUA", "APPEARANCEUA", "SGUA", "PHUA", "PROTEINUA", "GLUCOSEUA", "KETONESUA", "BLOODUA", "NITRITESUA", "LEUKOCYTESUR", "RBCUA", "WBCUA", "BACTERIA", "SQEPUA", "HYALINECASTS", "CREATRANDUR", "PROTEINURINE", "UPROTCREA"     Assessment:       ICD-10-CM ICD-9-CM   1. Mixed hyperlipidemia  E78.2 272.2   2. Essential hypertension, benign  I10 401.1   3. Vitamin D deficiency  E55.9 268.9   4. Transaminitis  R74.01 790.4   5. Fatty liver  K76.0 571.8   6. Hyperkalemia  E87.5 276.7   7. Class 3 severe obesity due to excess calories with serious comorbidity and body mass index (BMI) of 50.0 to 59.9 in adult  E66.813 278.01    Z68.43 V85.43    E66.01    8. Chronic pain of right ankle  M25.571 719.47    G89.29 338.29   9. Encounter for colorectal cancer screening  Z12.11 V76.51    Z12.12 V76.41        Plan:     1. Mixed hyperlipidemia  Overview:  Medication:  None  Risk category:  Borderline, BMI 52, smoker    ApoB:  Non-HDL-C:  117  LDL-C:  96    Plan:  At goal for borderline  Given severe obesity and smoking status, consider pushing to high-risk targets      Role of apolipoprotein B in the clinical management of cardiovascular risk in adults: An Expert Clinical Consensus from the National Lipid Association.   Journal of Clinical " Lipidology        Orders:  -     CBC Auto Differential; Future; Expected date: 09/10/2025  -     Comprehensive Metabolic Panel; Future; Expected date: 09/10/2025  -     Lipid Panel; Future; Expected date: 09/10/2025  -     Hemoglobin A1C; Future; Expected date: 09/10/2025  -     Urinalysis; Future; Expected date: 09/10/2025  -     Vitamin D; Future; Expected date: 09/10/2025    2. Essential hypertension, benign  Overview:  Lotrel 5-20 daily    Assessment & Plan:  At goal   Continue above medication at same dose    Orders:  -     CBC Auto Differential; Future; Expected date: 09/10/2025  -     Comprehensive Metabolic Panel; Future; Expected date: 09/10/2025  -     Lipid Panel; Future; Expected date: 09/10/2025  -     Hemoglobin A1C; Future; Expected date: 09/10/2025  -     Urinalysis; Future; Expected date: 09/10/2025  -     Vitamin D; Future; Expected date: 09/10/2025  -     amlodipine-benazepril 5-20 mg (LOTREL) 5-20 mg per capsule; Take 1 capsule by mouth once daily.  Dispense: 90 capsule; Refill: 1    3. Vitamin D deficiency  Overview:  Background:  Vitamin-D level today:  26  Preferred range per Endocrine Society is 40-60 ng/mL (Ref)  Current supplementation:  Unknown    Plan:  3000 IU OTC vitamin-D once daily  Repeat vitamin-D in 6 months      Orders:  -     CBC Auto Differential; Future; Expected date: 09/10/2025  -     Comprehensive Metabolic Panel; Future; Expected date: 09/10/2025  -     Lipid Panel; Future; Expected date: 09/10/2025  -     Hemoglobin A1C; Future; Expected date: 09/10/2025  -     Urinalysis; Future; Expected date: 09/10/2025  -     Vitamin D; Future; Expected date: 09/10/2025    4. Transaminitis  Overview:  Liver ultrasound shows fatty liver   BMI 52    Transaminitis resolved on labs today   See weight loss problem      Orders:  -     CBC Auto Differential; Future; Expected date: 09/10/2025  -     Comprehensive Metabolic Panel; Future; Expected date: 09/10/2025  -     Lipid Panel; Future;  Expected date: 09/10/2025  -     Hemoglobin A1C; Future; Expected date: 09/10/2025  -     Urinalysis; Future; Expected date: 09/10/2025  -     Vitamin D; Future; Expected date: 09/10/2025    5. Fatty liver  Overview:  Per ultrasound 2024   BMI 52   No transaminitis today   See obesity problem    Orders:  -     CBC Auto Differential; Future; Expected date: 09/10/2025  -     Comprehensive Metabolic Panel; Future; Expected date: 09/10/2025  -     Lipid Panel; Future; Expected date: 09/10/2025  -     Hemoglobin A1C; Future; Expected date: 09/10/2025  -     Urinalysis; Future; Expected date: 09/10/2025  -     Vitamin D; Future; Expected date: 09/10/2025    6. Hyperkalemia  Overview:  K 5.5   Asymptomatic  No potassium modifying medications    Assessment & Plan:  Repeat CMP one-month    Orders:  -     Comprehensive Metabolic Panel; Future; Expected date: 04/10/2025    7. Class 3 severe obesity due to excess calories with serious comorbidity and body mass index (BMI) of 50.0 to 59.9 in adult  Overview:  Body mass index is 51.83 kg/m².    Weight has been steady at BMI over 52   Tried and failed caloric restriction  No diabetes, A1c 5.6, elevated cholesterol  Current smoker    Assessment & Plan:  Bariatric referral made but patient had to cancel due to work   Declines another referral   He will try calorie counting for the next six-months again  If not successful in 6 months, we will refer to bariatric again      8. Chronic pain of right ankle  Comments:  Eight month history.  Anterior ankle pain.  No swelling or redness.  Hurts worse after a day for work.  We will get x-ray  Orders:  -     X-Ray Ankle Complete Right; Future; Expected date: 03/10/2025    9. Encounter for colorectal cancer screening  -     Ambulatory referral/consult to Gastroenterology; Future; Expected date: 03/17/2025         Follow up for One-month lab, six-month lab and visit. In addition to their scheduled follow up, the patient has also been instructed  to follow up on as needed basis.     Future Appointments   Date Time Provider Department Center   4/10/2025 10:00 AM LAB, [facility] BRITTNEE Larios MD

## 2025-04-11 ENCOUNTER — RESULTS FOLLOW-UP (OUTPATIENT)
Dept: FAMILY MEDICINE | Facility: CLINIC | Age: 50
End: 2025-04-11

## 2025-04-11 DIAGNOSIS — E87.5 HYPERKALEMIA: Primary | ICD-10-CM

## 2025-04-14 ENCOUNTER — TELEPHONE (OUTPATIENT)
Dept: FAMILY MEDICINE | Facility: CLINIC | Age: 50
End: 2025-04-14
Payer: COMMERCIAL

## 2025-04-14 NOTE — TELEPHONE ENCOUNTER
Patient notified of Dr Hurtado's findings and orders from his lab results. Patient states he does not eat banana and doesn't even like avocados. He will repeat lab at next appt 6/2025. Patient verbalized an understanding.